# Patient Record
Sex: MALE | Race: BLACK OR AFRICAN AMERICAN | NOT HISPANIC OR LATINO | Employment: OTHER | URBAN - METROPOLITAN AREA
[De-identification: names, ages, dates, MRNs, and addresses within clinical notes are randomized per-mention and may not be internally consistent; named-entity substitution may affect disease eponyms.]

---

## 2022-01-13 ENCOUNTER — ANCILLARY PROCEDURE (OUTPATIENT)
Dept: GENERAL RADIOLOGY | Facility: CLINIC | Age: 32
End: 2022-01-13
Attending: FAMILY MEDICINE

## 2022-01-13 ENCOUNTER — OFFICE VISIT (OUTPATIENT)
Dept: URGENT CARE | Facility: URGENT CARE | Age: 32
End: 2022-01-13

## 2022-01-13 VITALS
SYSTOLIC BLOOD PRESSURE: 118 MMHG | TEMPERATURE: 98 F | OXYGEN SATURATION: 98 % | WEIGHT: 208 LBS | HEART RATE: 68 BPM | DIASTOLIC BLOOD PRESSURE: 72 MMHG | RESPIRATION RATE: 16 BRPM

## 2022-01-13 DIAGNOSIS — R20.2 PARESTHESIAS: ICD-10-CM

## 2022-01-13 DIAGNOSIS — R05.9 COUGH: Primary | ICD-10-CM

## 2022-01-13 DIAGNOSIS — J01.90 ACUTE SINUSITIS WITH SYMPTOMS > 10 DAYS: ICD-10-CM

## 2022-01-13 DIAGNOSIS — R06.02 SOB (SHORTNESS OF BREATH): ICD-10-CM

## 2022-01-13 LAB
ALBUMIN SERPL-MCNC: 4 G/DL (ref 3.4–5)
ALP SERPL-CCNC: 57 U/L (ref 40–150)
ALT SERPL W P-5'-P-CCNC: 43 U/L
ANION GAP SERPL CALCULATED.3IONS-SCNC: 1 MMOL/L (ref 3–14)
AST SERPL W P-5'-P-CCNC: 33 U/L (ref 0–45)
BASOPHILS # BLD AUTO: 0 10E3/UL (ref 0–0.2)
BASOPHILS NFR BLD AUTO: 0 %
BILIRUB SERPL-MCNC: 1.2 MG/DL (ref 0.2–1.3)
BUN SERPL-MCNC: 12 MG/DL (ref 7–30)
CALCIUM SERPL-MCNC: 9.8 MG/DL (ref 8.5–10.1)
CHLORIDE BLD-SCNC: 107 MMOL/L (ref 94–109)
CO2 SERPL-SCNC: 33 MMOL/L (ref 20–32)
CREAT SERPL-MCNC: 1 MG/DL (ref 0.66–1.25)
EOSINOPHIL # BLD AUTO: 0.2 10E3/UL (ref 0–0.7)
EOSINOPHIL NFR BLD AUTO: 5 %
ERYTHROCYTE [DISTWIDTH] IN BLOOD BY AUTOMATED COUNT: 12.2 % (ref 10–15)
ERYTHROCYTE [SEDIMENTATION RATE] IN BLOOD BY WESTERGREN METHOD: 8 MM/HR (ref 0–15)
GFR SERPL CREATININE-BSD FRML MDRD: >90 ML/MIN/1.73M2
GLUCOSE BLD-MCNC: 102 MG/DL (ref 70–99)
HCT VFR BLD AUTO: 43.1 % (ref 40–53)
HGB BLD-MCNC: 15 G/DL (ref 13.3–17.7)
LYMPHOCYTES # BLD AUTO: 1.5 10E3/UL (ref 0.8–5.3)
LYMPHOCYTES NFR BLD AUTO: 35 %
MCH RBC QN AUTO: 30.7 PG (ref 26.5–33)
MCHC RBC AUTO-ENTMCNC: 34.8 G/DL (ref 31.5–36.5)
MCV RBC AUTO: 88 FL (ref 78–100)
MONOCYTES # BLD AUTO: 0.4 10E3/UL (ref 0–1.3)
MONOCYTES NFR BLD AUTO: 8 %
NEUTROPHILS # BLD AUTO: 2.2 10E3/UL (ref 1.6–8.3)
NEUTROPHILS NFR BLD AUTO: 52 %
PLATELET # BLD AUTO: 293 10E3/UL (ref 150–450)
POTASSIUM BLD-SCNC: 4.7 MMOL/L (ref 3.4–5.3)
PROT SERPL-MCNC: 7.6 G/DL (ref 6.8–8.8)
RBC # BLD AUTO: 4.89 10E6/UL (ref 4.4–5.9)
SODIUM SERPL-SCNC: 141 MMOL/L (ref 133–144)
WBC # BLD AUTO: 4.3 10E3/UL (ref 4–11)

## 2022-01-13 PROCEDURE — 99203 OFFICE O/P NEW LOW 30 MIN: CPT | Performed by: FAMILY MEDICINE

## 2022-01-13 PROCEDURE — 71046 X-RAY EXAM CHEST 2 VIEWS: CPT | Performed by: RADIOLOGY

## 2022-01-13 PROCEDURE — 80050 GENERAL HEALTH PANEL: CPT

## 2022-01-13 PROCEDURE — U0005 INFEC AGEN DETEC AMPLI PROBE: HCPCS | Mod: 90 | Performed by: FAMILY MEDICINE

## 2022-01-13 PROCEDURE — 85652 RBC SED RATE AUTOMATED: CPT

## 2022-01-13 PROCEDURE — U0003 INFECTIOUS AGENT DETECTION BY NUCLEIC ACID (DNA OR RNA); SEVERE ACUTE RESPIRATORY SYNDROME CORONAVIRUS 2 (SARS-COV-2) (CORONAVIRUS DISEASE [COVID-19]), AMPLIFIED PROBE TECHNIQUE, MAKING USE OF HIGH THROUGHPUT TECHNOLOGIES AS DESCRIBED BY CMS-2020-01-R: HCPCS | Mod: 90 | Performed by: FAMILY MEDICINE

## 2022-01-13 PROCEDURE — 36415 COLL VENOUS BLD VENIPUNCTURE: CPT

## 2022-01-13 PROCEDURE — 99000 SPECIMEN HANDLING OFFICE-LAB: CPT | Performed by: FAMILY MEDICINE

## 2022-01-13 RX ORDER — ALBUTEROL SULFATE 90 UG/1
2 AEROSOL, METERED RESPIRATORY (INHALATION) EVERY 6 HOURS PRN
Qty: 18 G | Refills: 0 | Status: SHIPPED | OUTPATIENT
Start: 2022-01-13 | End: 2023-01-31

## 2022-01-13 NOTE — LETTER
January 13, 2022      Deedee Elder  [address] [place] [address]        To Whom It May Concern:    Deedee Elder  was seen on 1/13.  Please excuse him from work 1/13 - 1/16 due to illness, possible COVID infection.  COVID screen obtained today and he will need to quarantine (okay to work from home) until his results comes back (this may take upwards to 2-3 days).        Sincerely,        Joseph Munguia MD

## 2022-01-13 NOTE — PATIENT INSTRUCTIONS
Patient Education     Sinusitis (Antibiotic Treatment)    The sinuses are air-filled spaces within the bones of the face. They connect to the inside of the nose. Sinusitis is an inflammation of the tissue that lines the sinuses. Sinusitis can occur during a cold. It can also happen due to allergies to pollens and other particles in the air. Sinusitis can cause symptoms of sinus congestion and a feeling of fullness. A sinus infection causes fever, headache, and facial pain. There is often green or yellow fluid draining from the nose or into the back of the throat (post-nasal drip). You have been given antibiotics to treat this condition.   Home care    Take the full course of antibiotics as instructed. Don't stop taking them, even when you feel better.    Drink plenty of water, hot tea, and other liquids as directed by the healthcare provider. This may help thin nasal mucus. It also may help your sinuses drain fluids.    Heat may help soothe painful areas of your face. Use a towel soaked in hot water. Or,  the shower and direct the warm spray onto your face. Using a vaporizer along with a menthol rub at night may also help soothe symptoms.     An expectorant with guaifenesin may help thin nasal mucus and help your sinuses drain fluids. Talk with your provider or pharmacists before taking an over-the-counter (OTC) medicine if you have any questions about it or its side effects..    You can use an OTC decongestant, unless a similar medicine was prescribed to you. Nasal sprays work the fastest. Use one that contains phenylephrine or oxymetazoline. First blow your nose gently. Then use the spray. Don't use these medicines more often than directed on the label. If you do, your symptoms may get worse. You may also take pills that contain pseudoephedrine. Don t use products that combine multiple medicines. This is because side effects may be increased. Read labels. You can also ask the pharmacist for help. (People  with high blood pressure should not use decongestants. They can raise blood pressure.) Talk with your provider or pharmacist if you have any questions about the medicine..    OTC antihistamines may help if allergies contributed to your sinusitis. Talk with your provider or pharmacist if you have any questions about the medicine..    Don't use nasal rinses or irrigation during an acute sinus infection, unless your healthcare provider tells you to. Rinsing may spread the infection to other areas in your sinuses.    Use acetaminophen or ibuprofen to control pain, unless another pain medicine was prescribed to you. If you have chronic liver or kidney disease or ever had a stomach ulcer, talk with your healthcare provider before using these medicines. Never give aspirin to anyone under age 18 who is ill with a fever. It may cause severe liver damage.    Don't smoke. This can make symptoms worse.    Follow-up care  Follow up with your healthcare provider, or as advised.   When to seek medical advice  Call your healthcare provider if any of these occur:     Facial pain or headache that gets worse    Stiff neck    Unusual drowsiness or confusion    Swelling of your forehead or eyelids    Symptoms don't go away in 10 days    Vision problems, such as blurred or double vision    Fever of 100.4 F (38 C) or higher, or as directed by your healthcare provider  Call 911  Call 911 if any of these occur:     Seizure    Trouble breathing    Feeling dizzy or faint    Fingernails, skin or lips look blue, purple , or gray  Prevention  Here are steps you can take to help prevent an infection:     Keep good hand washing habits.    Don t have close contact with people who have sore throats, colds, or other upper respiratory infections.    Don t smoke, and stay away from secondhand smoke.    Stay up to date with of your vaccines.  [employer] last reviewed this educational content on 12/1/2019 2000-2021 [employer]. All rights  "reserved. This information is not intended as a substitute for professional medical care. Always follow your healthcare professional's instructions.           Patient Education     Paraesthesias  Paraesthesia is a burning or prickling sensation that is sometimes felt in the hands, arms, legs or feet. It can also occur in other parts of the body. It can also feel like tingling or numbness, skin crawling, or itching. The feeling is not comfortable, but it is not painful. (The \"pins and needles\" feeling that happens when a foot or hand \"falls asleep\" is a temporary paraesthesia.)  Paraesthesias that last or come and go may be caused by medical issues that need to be treated. These include stroke, a bulging disk pressing on a nerve, a trapped nerve, vitamin deficiencies, uncontrolled diabetes, alcohol abuse, or even certain medicines.  Tests are often done. These tests may include blood tests, X-ray, CT (computerized tomography) scan, nerve conduction studies (NCS), or a muscle test (electromyography). Depending on the cause, treatment may include physical therapy.  Home care    Tell your healthcare provider about all medicines you take. This includes prescription and over-the-counter medicines, vitamins, and herbs. Ask if any of the medicines may be causing your problems. Don't make any changes to prescription medicines without talking to your healthcare provider first.    You may be prescribed medicines to help relieve the tingling feeling or for pain. Take all medicines as directed.    A numb hand or foot may be more prone to injury. To help protect it:  ? Always use oven mitts.  ? Test water with an unaffected hand or foot.  ? Use caution when trimming nails. File sharp areas.  ? Wear shoes that fit well to avoid pressure points, blisters, and ulcers.  ? Inspect your hands and feet carefully (including the soles of your feet and between your toes) daily. If you see red areas, sores, or other problems, tell your " healthcare provider.  Follow-up care  Follow up with your doctor, or as advised. You may need further testing or evaluation.     When to seek medical advice  Call your healthcare provider right away if any of the following occur:    Numbness or weakness of the face, one arm, or one leg    Slurred speech, confusion, trouble speaking, walking, or seeing    Severe headache, fainting spell, dizziness, or seizure    Chest, arm, neck, or upper back pain    Loss of bladder or bowel control    Open wound with redness, swelling, or pus     [employer] last reviewed this educational content on 4/1/2018 2000-2021 [employer]. All rights reserved. This information is not intended as a substitute for professional medical care. Always follow your healthcare professional's instructions.           Patient Education       Coronavirus Disease 2019 (COVID-19): Overview  Coronavirus disease 2019 (COVID-19) is an illness that infects the lungs. It's caused by a type of coronavirus called SARS-CoV-2. There are many types of coronaviruses. They are a very common cause of colds and bronchitis. They can cause a lung infection called pneumonia. Symptoms can range from mild to severe. Some people have no symptoms. These viruses are also found in some animals.   The virus that causes COVID-19 changes (mutates) all the time. This is what all viruses do. It leads to different versions of a virus. These are called variants. COVID-19 variants may spread more easily from person to person. They may cause milder symptoms. Or they may cause more severe symptoms.    The virus spreads and infects people easily. It can infect a person more easily if they are not immune to it. The virus most often spreads through droplets of fluid that a person coughs or sneezes into the air. It may be spread to you if you touch a surface with the virus on it and then touch your eyes, nose, or mouth.      To help prevent spreading the infection, wash your  hands often, or use an alcohol-based hand .     For the latest information from the CDC:      Go to the CDC website    Call [phone] ([phone])    What are the symptoms of COVID-19?  Some people have no symptoms. Some have mild symptoms. And other people may have severe symptoms. Types of symptoms can vary from person to person. They may appear 2 to 14 days after contact with the virus. They can include:     Fever    Chills    Coughing    Trouble breathing or feeling short of breath    Sore throat    Stuffy or runny nose    Headache    Body aches    Tiredness    Nausea, vomiting, diarrhea, or abdominal pain    New loss of sense of smell or taste  Check your symptoms with the CDC s Coronavirus Self-.   What are possible complications of COVID-19?  The virus can cause an infection in the lungs. This is called pneumonia. In some cases, this can lead to death. Experts are still learning more about COVID-19 complications. Many other complications are possible. They include:     Low blood pressure    Kidney failure    Inflammation of the brain or heart    Rashes  Some people are at higher risk for complications. This includes:     Older adults    People with heart or lung disease    People with diabetes or kidney disease    People with health conditions that suppress the immune system    People who take medicines that suppress the immune system  Rarely, a child may have a severe complication. This is called multisystem inflammatory syndrome in children (MIS-C). MIS-C seems to be like Kawasaki disease. This is a rare illness. It causes inflammation of blood vessels and body organs. MIS can also happen in adults. But this is less common.     How is COVID-19 diagnosed?  Your healthcare provider will ask:    What symptoms you have    Where you live    If you ve traveled recently    If you ve had contact with sick people    If you are vaccinated against COVID-19    If you have had COVID-19   You may  have 1 of these tests for COVID-19:    Viral (molecular) test. You may also hear this called a PCR or RT-PCR test. Viral tests are very accurate. A viral test looks for the genetic material (RNA) of the SARS-CoV-2 virus. There are a few ways to do this. A swab may be wiped inside your nose or throat. Or a long swab may be put into your nose down to the back of your throat. Or a sample of your saliva may be taken. Your test results may be back in 45 minutes to a few hours. This depends on the type of test. Some tests must be sent to a lab. These can take several days for the results. Test kits you can use at home are now available. Some of these need a prescription. If you use a home kit, follow the instructions in the kit closely. Some kits show results quickly at home. Others must be sent to a lab for the results.    Antigen test. This can find proteins from the SARS-CoV-2 virus. A swab may be wiped inside your nose or throat. Or a long swab may be put into your nose down to the back of your throat. Some results are back within 15 to 60 minutes. This depends on the type of test. Positive results are very accurate. But false positive results can happen. And the results can be negative even in people with COVID-19. This is more common in places where not many people have the virus. Antigen tests are more likely to miss a COVID-19 infection than a viral (molecular) test. You may need to have a viral test if your antigen test is negative but you have symptoms of COVID-19.  If your provider thinks or confirms that you have COVID-19, you may have other tests. These tests may include:     Antibody blood test. This type of test can show if you had the virus in the past. It shows antibodies for the virus in the blood. The accuracy of these tests varies. And they are not available everywhere. An antibody test may not show if you have an infection right now. This is because it can take up to a few weeks for your body to make  antibodies. None of the antibody tests can yet be used to tell if a person is immune to the virus.    Sputum culture. If you have a wet cough, you may be asked to cough up a bit of mucus (sputum) from your lungs. This is tested for the virus. It may be tested for pneumonia.    Imaging tests. You may have a chest X-ray or CT scan.  Can you get COVID-19 again?  Yes, you can get COVID-19 more than once. You may have not gotten immunity. You could have lost the immunity. Or you may get COVID-19 from a different strain (variant) of the virus that you are not immune to. But the COVID-19 vaccine helps people who had COVID-19 lower their risk of having the illness again.   Vaccines for COVID-19  The FDA and CDC advise vaccines to help prevent COVID-19. The vaccines can also make the illness less severe. It can keep you from needing to go to the hospital.  And it can prevent the spread of the virus to other people. No vaccine is 100% effective at preventing an illness. But getting a vaccine is important. The Pfizer vaccine is available for people as young as age 5. Pregnant or breastfeeding people can have the vaccine. Ask your healthcare provider which vaccine may be best for you.   The vaccines are given as a shot (injection). This is most often done in a muscle in the upper arm. There is a 1-dose vaccine. This is from [employer]. There are 2-dose vaccines. These are from Pfizer and Moderna. For a 2-dose vaccine, the second dose is given several weeks after the first. Some people may need a third dose of Pfizer or Moderna.   Who needs a third dose of Pfizer or Moderna?  A third dose of the Pfizer or Moderna vaccine may be needed for some people. This is for people who have a very weak immune system. The third dose is part of their first (primary) series. It's not a booster. This can happen if you had a solid organ transplant. It can be caused by other conditions or treatments. This third dose is to help a person  with a weak immune system build up better protection against the virus. It's given at least 28 days after the second dose. Talk with your healthcare provider about your health risks to see if you need a third dose as part of your primary series.   COVID-19 vaccine booster shots  Everyone age 18 or older can get a COVID-19 booster shot. Here are your options.   Pfizer or Moderna booster  A booster shot of the Pfizer or Moderna vaccines is advised for many people. The booster shot is to be given at least 6 months after your primary series. Talk with your healthcare provider about your situation and risk. The CDC says these people should get a Pfizer or Moderna booster shot:       People age 50 or older    People age 18 or older who live in long-term care facilities    The CDC states people 18 to 49 may choose to get the booster. This depends on their specific case and risk. This includes people with certain health conditions. It also includes people whose job or living setting puts them at higher risk for COVID-19.   Socrates & Socrates booster  A booster shot of the 1-dose Socrates & Socrates vaccine is also available. It's advised for people ages 18 and older who got their first dose 2 or more months ago.   Mix and match  You may be able to choose which vaccine you get as a booster. This is called mix and match. Talk with your healthcare provider to learn more.   How is COVID-19 treated?  The most proven treatments right now are those to help your body while it fights the virus. This is known as supportive care. It includes:     Getting rest.This helps your body fight the illness.    Drinking fluids. Try to drink 6 to 8 glasses of fluids every day. Ask your provider which drinks are best for you. Don't have drinks with caffeine or alcohol.    Taking over-the-counter (OTC) medicine.  These are used to help ease pain and reduce fever. Ask your provider which OTC medicine is safe for you to use.  You may need to stay in  the hospital for severe illness. Your care may include:     IV (intravenous) fluids. These are given through a vein. This helps to replace fluids in your body.    Oxygen. You may be given supplemental oxygen. Or you may be put on a breathing machine (ventilator). This is done so you get enough oxygen in your body.    Prone positioning. Your healthcare team may regularly turn you on your stomach. This is called prone positioning. It helps increase the amount of oxygen you get to your lungs. Follow their instructions on position changes while you're in the hospital. Also follow their advice on the best positions to help your breathing once you go home.    Remdesivir. This is an antiviral medicine. The FDA has approved it for use on COVID-19. It works by stopping the spread of the SARS-CoV-2 virus in the body. It's approved only for people who are in the hospital. It's for people 12 years and older who weigh at least 88 pounds (40 kgs). In some cases, it may also be used for people younger than 12 years or who weigh less than 88 pounds (40 kgs).    Steroids or other anti-inflammatory medicines.  These are used to lessen the intense inflammation that some people with COVID-19 can have. The inflammation can lead to more trouble breathing. It can cause other complications or death.    COVID-19 convalescent plasma. Plasma is the liquid part of blood. People who had COVID-19 may be asked to donate plasma. This is called COVID-19 convalescent plasma. The plasma may have antibodies. These can help fight COVID-19 in people who are very ill with it. The FDA has approved it for emergency use in some people with severe but early COVID-19. Ask your provider if you qualify to donate.    Monoclonal antibody therapy. The FDA approved this for emergency use in some people. They must have a positive COVID-19 viral test. They must have mild to moderate symptoms. They can t be in the hospital. It's approved for people 12 years and older.  They must weigh at least 88 pounds (40 kgs). And they must be at high risk for severe COVID-19 and a hospital stay. This includes people who are 65 years and older. And it includes people with some chronic conditions. This therapy is not approved for people who are in the hospital with COVID-19 or who need oxygen. Your healthcare team will tell you if you qualify.  Are you at risk for COVID-19?  You are at risk for COVID-19 if any of these apply to you:    You live in an area with cases of COVID-19    You traveled to an area with cases of COVID-19    You had close contact with someone who had COVID-19  Close contact means being within 6 feet.   Keep in mind that COVID-19 may be spread by people who do not show symptoms.   Last updated: 11/22/2021   [employer] last reviewed this educational content on 9/1/2021 2000-2021 [employer]. All rights reserved. This information is not intended as a substitute for professional medical care. Always follow your healthcare professional's instructions.
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
## 2022-01-14 LAB
SARS-COV-2 RNA RESP QL NAA+PROBE: DETECTED
SARS-COV-2 RNA RESP QL NAA+PROBE: NORMAL
TSH SERPL DL<=0.005 MIU/L-ACNC: 0.75 MU/L (ref 0.4–4)

## 2022-02-06 ENCOUNTER — HEALTH MAINTENANCE LETTER (OUTPATIENT)
Age: 32
End: 2022-02-06

## 2022-06-01 ENCOUNTER — TRANSFERRED RECORDS (OUTPATIENT)
Dept: HEALTH INFORMATION MANAGEMENT | Facility: CLINIC | Age: 32
End: 2022-06-01

## 2022-10-03 ENCOUNTER — HEALTH MAINTENANCE LETTER (OUTPATIENT)
Age: 32
End: 2022-10-03

## 2022-10-19 ENCOUNTER — TRANSFERRED RECORDS (OUTPATIENT)
Dept: HEALTH INFORMATION MANAGEMENT | Facility: CLINIC | Age: 32
End: 2022-10-19

## 2022-10-19 LAB
CREATININE (EXTERNAL): 1.09 MG/DL (ref 0.5–1.39)
GFR ESTIMATED (EXTERNAL): >60 ML/MIN/1.73M2
GLUCOSE (EXTERNAL): 79 MG/DL (ref 60–199)
POTASSIUM (EXTERNAL): 3.8 MMOL/L (ref 3.6–5.1)

## 2023-01-31 ENCOUNTER — VIRTUAL VISIT (OUTPATIENT)
Dept: FAMILY MEDICINE | Facility: CLINIC | Age: 33
End: 2023-01-31
Payer: MEDICAID

## 2023-01-31 DIAGNOSIS — Z00.00 HEALTHCARE MAINTENANCE: Primary | ICD-10-CM

## 2023-01-31 PROCEDURE — 99213 OFFICE O/P EST LOW 20 MIN: CPT | Mod: 95 | Performed by: FAMILY MEDICINE

## 2023-01-31 NOTE — PROGRESS NOTES
Deedee is a 32 year old who is being evaluated via a billable video visit.      How would you like to obtain your AVS? MyChart  If the video visit is dropped, the invitation should be resent by: Text to cell phone: [phone]  Will anyone else be joining your video visit? No      32-year-old  Calling to establish care    Recently moved from [place].  Visually from [place]  Sounds like he did some present time fairly recently    Has worked as a , now working some, freelancing  Recently quit smoking    Trying to avoid a lot of weight gain.  Since quitting smoking has started eating more  Trying to be healthy.  1. Healthcare maintenance  Patient indicates that he thinks he had a tetanus shot 2 or 3 years ago.  Not interested in flu or COVID shots    Had cholesterol checked 6 months ago and will bring in results when he comes    Discussed diet and weight gain-recommend low carbohydrate, lots of fruits and vegetables.  Patient taking chlorophyll and some nutritional alcantara    We will see him back in person in the near future.  Call to set up appointment    Video-Visit Details    Type of service:  Video Visit   Video Start Time: 524  Video End Time:540    Originating Location (pt. Location): Home    Distant Location (provider location):  On-site  Platform used for Video Visit: Christiane    Answers for HPI/ROS submitted by the patient on 1/31/2023  What is the reason for your visit today? : Establish Care

## 2023-02-07 ENCOUNTER — OFFICE VISIT (OUTPATIENT)
Dept: FAMILY MEDICINE | Facility: CLINIC | Age: 33
End: 2023-02-07
Payer: COMMERCIAL

## 2023-02-07 VITALS
SYSTOLIC BLOOD PRESSURE: 132 MMHG | HEART RATE: 70 BPM | RESPIRATION RATE: 20 BRPM | HEIGHT: 75 IN | OXYGEN SATURATION: 98 % | BODY MASS INDEX: 27.1 KG/M2 | WEIGHT: 218 LBS | DIASTOLIC BLOOD PRESSURE: 80 MMHG

## 2023-02-07 DIAGNOSIS — S62.339P CLOSED BOXER'S FRACTURE WITH MALUNION, SUBSEQUENT ENCOUNTER: ICD-10-CM

## 2023-02-07 DIAGNOSIS — Z00.00 HEALTHCARE MAINTENANCE: ICD-10-CM

## 2023-02-07 DIAGNOSIS — M75.81 TENDINITIS OF RIGHT ROTATOR CUFF: Primary | ICD-10-CM

## 2023-02-07 PROCEDURE — 99213 OFFICE O/P EST LOW 20 MIN: CPT | Performed by: FAMILY MEDICINE

## 2023-02-07 ASSESSMENT — ENCOUNTER SYMPTOMS
CONSTIPATION: 0
EYE PAIN: 0
PALPITATIONS: 0
FREQUENCY: 0
SORE THROAT: 0
COUGH: 0
WEAKNESS: 0
PARESTHESIAS: 0
MYALGIAS: 0
SHORTNESS OF BREATH: 0
DYSURIA: 0
NERVOUS/ANXIOUS: 0
JOINT SWELLING: 0
CHILLS: 0
ABDOMINAL PAIN: 0
HEARTBURN: 0
HEMATOCHEZIA: 0
DIARRHEA: 0
DIZZINESS: 0
HEMATURIA: 0
HEADACHES: 0
NAUSEA: 0
ARTHRALGIAS: 0
FEVER: 0

## 2023-02-07 NOTE — ASSESSMENT & PLAN NOTE
Primarily supraspinatus tendon.  No acute trauma in the past but does a lot of lifting and boxing.  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    Referral to physical therapy  Discussed option of corticosteroid injection at some point if not improving/referral to Ortho.

## 2023-02-07 NOTE — PROGRESS NOTES
"Assessment/ Plan  Tendinitis of right rotator cuff  Primarily supraspinatus tendon.  No acute trauma in the past but does a lot of lifting and boxing.  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    Referral to physical therapy  Discussed option of corticosteroid injection at some point if not improving/referral to Ortho.    Healthcare maintenance  Fairly recent preventative health visit with STD testing, cholesterol and blood sugar in [place] before he came.  AUGUSTINA has been requested.  Not currently sexually active    Closed boxer's fracture with malunion, subsequent encounter  Treated nonsurgically in the last couple of years.  Thinks he may have reinjured it.  Pain with boxing, does have some obvious curvature.  Referral to hand surgery.     Subjective  CC:  chief complaint  HPI:  Right shoulder pain, 3-week history, came on fairly gradually without injury.  Though began after starting to work with heavy punching bag.  Also does a lot of lifting/bench pressing.  Pain anterior/superior glenohumeral region.  Hurts with certain movements  PFSH:  Patient Active Problem List   Diagnosis     Closed boxer's fracture with malunion, subsequent encounter     Healthcare maintenance     Tendinitis of right rotator cuff     No current outpatient medications on file prior to visit.  No current facility-administered medications on file prior to visit.       History   Smoking Status     Never   Smokeless Tobacco     Never     Social History     Social History Narrative    Single    Late 2022 moved from [place]        boxing     Patient Care Team:  No Ref-Primary, Physician as PCP - General        Objective  Physical Exam  Vitals:    02/07/23 1051   BP: 132/80   BP Location: Left arm   Patient Position: Sitting   Cuff Size: Adult Large   Pulse: 70   Resp: 20   SpO2: 98%   Weight: 98.9 kg (218 lb)   Height: 1.905 m (6' 3\")     Body mass index is 27.25 kg/m .  Symmetric musculature of shoulder " muscles and scapular muscles when viewed from behind.  No pain to palpation on AC joint coracoid process and glenohumeral joint on affected shoulder.  Passive range of motion to 90  is normal and pain-free.  Negative apprehension test.    There is no pain on resisted internal rotation and intact strength.  There is no pain on resisted external rotation and intact strength  Empty can sign is positive    Diagnostics:   None      Please note: Voice recognition software was used in this dictation.  It may therefore contain typographical errors.      Answers for HPI/ROS submitted by the patient on 2/7/2023  Frequency of exercise:: 6-7 days/week  Getting at least 3 servings of Calcium per day:: NO  Diet:: Regular (no restrictions)  Taking medications regularly:: Not Applicable  Medication side effects:: Not applicable  Bi-annual eye exam:: Yes  Dental care twice a year:: Yes  Sleep apnea or symptoms of sleep apnea:: None  abdominal pain: No  Blood in stool: No  Blood in urine: No  chest pain: No  chills: No  congestion: No  constipation: No  cough: No  diarrhea: No  dizziness: No  ear pain: No  eye pain: No  nervous/anxious: No  fever: No  frequency: No  genital sores: No  headaches: No  hearing loss: No  heartburn: No  arthralgias: No  joint swelling: No  peripheral edema: No  mood changes: No  myalgias: No  nausea: No  dysuria: No  palpitations: No  Skin sensation changes: No  sore throat: No  urgency: No  rash: No  shortness of breath: No  visual disturbance: No  weakness: No  impotence: No  penile discharge: No  Additional concerns today:: No  Duration of exercise:: Greater than 60 minutes

## 2023-02-07 NOTE — ASSESSMENT & PLAN NOTE
Fairly recent preventative health visit with STD testing, cholesterol and blood sugar in [place] before he came.  AUGUSTINA has been requested.  Not currently sexually active

## 2023-02-07 NOTE — ASSESSMENT & PLAN NOTE
Treated nonsurgically in the last couple of years.  Thinks he may have reinjured it.  Pain with boxing, does have some obvious curvature.  Referral to hand surgery.
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
## 2023-02-12 ENCOUNTER — HEALTH MAINTENANCE LETTER (OUTPATIENT)
Age: 33
End: 2023-02-12

## 2023-02-13 ENCOUNTER — THERAPY VISIT (OUTPATIENT)
Dept: PHYSICAL THERAPY | Facility: CLINIC | Age: 33
End: 2023-02-13
Attending: FAMILY MEDICINE
Payer: COMMERCIAL

## 2023-02-13 DIAGNOSIS — M75.81 TENDINITIS OF RIGHT ROTATOR CUFF: ICD-10-CM

## 2023-02-13 PROCEDURE — 97161 PT EVAL LOW COMPLEX 20 MIN: CPT | Mod: GP | Performed by: PHYSICAL THERAPIST

## 2023-02-13 PROCEDURE — 97110 THERAPEUTIC EXERCISES: CPT | Mod: GP | Performed by: PHYSICAL THERAPIST

## 2023-02-13 NOTE — PROGRESS NOTES
[facility]    OUTPATIENT Physical Therapy ORTHOPEDIC EVALUATION  PLAN OF TREATMENT FOR OUTPATIENT REHABILITATION  (COMPLETE FOR INITIAL CLAIMS ONLY)  Patient's Last Name, First Name, M.I.  YOB: 1990  Deedee Elder    Provider s Name:  [facility]   Medical Record No.  [id number]   Start of Care Date:  02/13/23   Onset Date:   02/07/23 (MD order)   Treatment Diagnosis:    Medical Diagnosis:  Tendinitis of right rotator cuff       Goals:     02/13/23 0500   Body Part   Goals listed below are for Right shoulder   Goal #1   Goal #1 reaching   Previous Functional Level No restrictions   Current Functional Level Can reach ;behind back;to shoulder level;overhead;out to the side   Performance level with 6/10 pain   STG Target Performance Reach ;to shoulder level;out to the side   Performance level with 2/10 pain   Rationale for dressing;for bathing   Due date 03/06/23   LTG Target Performance Reach;overhead;out to the side;behind back   Performance Level with 1/10 pain   Rationale for dressing;for bathing;for safe driving, hook seat belt, reach shift lever and turn signal, using both hands on steering wheel   Due date 04/10/23         Therapy Frequency:  1x per week for 6 weeks, tapering to 2x per month 2 months  Predicted Duration of Therapy Intervention:  3 months    Ethel Hopkins, PT                 I CERTIFY THE NEED FOR THESE SERVICES FURNISHED UNDER        THIS PLAN OF TREATMENT AND WHILE UNDER MY CARE     (Physician attestation of this document indicates review and certification of the therapy plan).                     Certification Date From:  02/13/23   Certification Date To:  05/04/23    Referring Provider:  Robin Connolly    Initial Assessment        See Epic Evaluation SOC Date: 02/13/23

## 2023-03-02 ENCOUNTER — TELEPHONE (OUTPATIENT)
Dept: PHYSICAL THERAPY | Facility: CLINIC | Age: 33
End: 2023-03-02

## 2023-03-02 DIAGNOSIS — M75.81 TENDINITIS OF RIGHT ROTATOR CUFF: Primary | ICD-10-CM

## 2023-03-02 NOTE — TELEPHONE ENCOUNTER
Left VM on patient's phone on 3/2/2023 at 10:32am stating future appointments will be canceled due to no show policy. Patient was also informed that he can make future appointments, but will have to call the clinic at [phone] to do so.

## 2023-03-14 ENCOUNTER — OFFICE VISIT (OUTPATIENT)
Dept: FAMILY MEDICINE | Facility: CLINIC | Age: 33
End: 2023-03-14
Payer: COMMERCIAL

## 2023-03-14 VITALS
TEMPERATURE: 98.2 F | BODY MASS INDEX: 27.25 KG/M2 | SYSTOLIC BLOOD PRESSURE: 132 MMHG | DIASTOLIC BLOOD PRESSURE: 74 MMHG | WEIGHT: 218 LBS | OXYGEN SATURATION: 99 % | HEART RATE: 71 BPM

## 2023-03-14 DIAGNOSIS — R82.90 ABNORMAL URINALYSIS: ICD-10-CM

## 2023-03-14 DIAGNOSIS — Z11.59 NEED FOR HEPATITIS C SCREENING TEST: ICD-10-CM

## 2023-03-14 DIAGNOSIS — Z11.4 SCREENING FOR HIV (HUMAN IMMUNODEFICIENCY VIRUS): ICD-10-CM

## 2023-03-14 DIAGNOSIS — Z09 HOSPITAL DISCHARGE FOLLOW-UP: Primary | ICD-10-CM

## 2023-03-14 LAB
ALBUMIN UR-MCNC: NEGATIVE MG/DL
APPEARANCE UR: CLEAR
BILIRUB UR QL STRIP: NEGATIVE
COLOR UR AUTO: YELLOW
GLUCOSE UR STRIP-MCNC: NEGATIVE MG/DL
HGB UR QL STRIP: NEGATIVE
KETONES UR STRIP-MCNC: NEGATIVE MG/DL
LEUKOCYTE ESTERASE UR QL STRIP: NEGATIVE
NITRATE UR QL: NEGATIVE
PH UR STRIP: 7 [PH] (ref 5–8)
SP GR UR STRIP: 1.02 (ref 1–1.03)
UROBILINOGEN UR STRIP-ACNC: 1 E.U./DL

## 2023-03-14 PROCEDURE — 81003 URINALYSIS AUTO W/O SCOPE: CPT | Performed by: FAMILY MEDICINE

## 2023-03-14 PROCEDURE — 99214 OFFICE O/P EST MOD 30 MIN: CPT | Performed by: FAMILY MEDICINE

## 2023-03-14 NOTE — PROGRESS NOTES
"  Assessment & Plan     Hospital discharge follow-up  Recent ER visit for influenza-like symptoms, cough nausea constipation diarrhea.  Overall symptoms improved with symptomatic care and fluid.  Discussed possibly follow-up with gastroenterologist if recurrent blood in the stool.  Abnormal urinalysis  Urine done in the ER showed urobilinogen, but normal UA today  - UA Macroscopic with reflex to Microscopic and Culture; Future  - UA Macroscopic with reflex to Microscopic and Culture    Screening for HIV (human immunodeficiency virus)   HIV Antigen Antibody Combo; Future    Need for hepatitis C screening test    - Hepatitis C Screen Reflex to HCV RNA Quant and Genotype; Future    Review of external notes as documented elsewhere in note  30 minutes spent on the date of the encounter doing chart review, review of outside records, review of test results, interpretation of tests, patient visit and documentation      MED REC REQUIRED  Post Medication Reconciliation Status:  Discharge medications reconciled, continue medications without change    Nicotine/Tobacco Cessation:  He reports that he has been smoking cigarettes. He has never used smokeless tobacco.  Nicotine/Tobacco Cessation Plan:   Self help information given to patient      BMI:   Estimated body mass index is 27.25 kg/m  as calculated from the following:    Height as of 2/7/23: 1.905 m (6' 3\").    Weight as of this encounter: 98.9 kg (218 lb).   Weight management plan: Discussed healthy diet and exercise guidelines    Regular exercise    No follow-ups on file.    Elle Wells MD  [facility]    Nick Mark is a 32 year old accompanied by his self, presenting for the following health issues:  Hospital F/U, Back Pain, and Referral (Colonoscopy)      HPI     Hospital Follow-up Visit:    Hospital/Nursing Home/ Rehab Facility: [facility] ER  Date of Admission: 3/8  Date of Discharge: 3/8  Reason(s) for Admission: Influenza-like " symptoms with diarrhea cough sneezing    Was your hospitalization related to COVID-19? No   Problems taking medications regularly:  None  Medication changes since discharge: None  Problems adhering to non-medication therapy:  None    Summary of hospitalization:  CareEverywhere information obtained and reviewed  Diagnostic Tests/Treatments reviewed.  Follow up needed: none  Other Healthcare Providers Involved in Patient s Care:         None  Update since discharge: improved.         Plan of care communicated with patient           Patient is following on an ER visit, was seen on March 8, 2023 at [facility] ER for gastrointestinal symptoms included diarrhea, nausea, associated with URI, cough sneezing runny small amount of bright red blood in the stool.  Nothing since then.  Patient did change his diet, his symptoms are improved.  He was asked to follow-up and recheck a urine, denies any burning urination.    Review of Systems   Constitutional, HEENT, cardiovascular, pulmonary, gi and gu systems are negative, except as otherwise noted.      Objective    /74 (BP Location: Right arm, Patient Position: Sitting, Cuff Size: Adult Large)   Pulse 71   Temp 98.2  F (36.8  C) (Temporal)   Wt 98.9 kg (218 lb)   SpO2 99%   BMI 27.25 kg/m    Body mass index is 27.25 kg/m .  Physical Exam   GENERAL: healthy, alert and no distress  NECK: no adenopathy, no asymmetry, masses, or scars and thyroid normal to palpation  RESP: lungs clear to auscultation - no rales, rhonchi or wheezes  CV: regular rate and rhythm, normal S1 S2, no S3 or S4, no murmur, click or rub, no peripheral edema and peripheral pulses strong  ABDOMEN: soft, nontender, no hepatosplenomegaly, no masses and bowel sounds normal  MS: no gross musculoskeletal defects noted, no edema    Results for orders placed or performed in visit on 03/14/23   UA Macroscopic with reflex to Microscopic and Culture     Status: Normal    Specimen: Urine, Midstream   Result Value  Ref Range    Color Urine Yellow Colorless, Straw, Light Yellow, Yellow    Appearance Urine Clear Clear    Glucose Urine Negative Negative mg/dL    Bilirubin Urine Negative Negative    Ketones Urine Negative Negative mg/dL    Specific Gravity Urine 1.020 1.005 - 1.030    Blood Urine Negative Negative    pH Urine 7.0 5.0 - 8.0    Protein Albumin Urine Negative Negative mg/dL    Urobilinogen Urine 1.0 0.2, 1.0 E.U./dL    Nitrite Urine Negative Negative    Leukocyte Esterase Urine Negative Negative    Narrative    Microscopic not indicated       This note was dictated using a voice recognition software.  Any grammatical or context distortion are unintentional and inherent to the software.

## 2023-04-24 PROBLEM — M75.81 TENDINITIS OF RIGHT ROTATOR CUFF: Status: RESOLVED | Noted: 2023-02-07 | Resolved: 2023-04-24

## 2023-04-24 NOTE — PROGRESS NOTES
Discharge Note    Progress reporting period is from initial evaluation date (please see noted date below) to Feb 13, 2023.  Linked Episodes   Type: Episode: Status: Noted: Resolved: Last update: Updated by:   PHYSICAL THERAPY Right shoulder pain 2/13/2023 Active 2/13/2023  3/2/2023 10:32 AM Ethel Howe, PT      Comments:       Deedee failed to follow up and current status is unknown.  Please see information below for last relevant information on current status.  Patient seen for 1 visits.    SUBJECTIVE  Subjective changes noted by patient:  see eval  .  Current pain level is  .     Previous pain level was   .   Changes in function:  Yes (See Goal flowsheet attached for changes in current functional level)  Adverse reaction to treatment or activity: None    OBJECTIVE  Changes noted in objective findings: see eval     ASSESSMENT/PLAN      Updated problem list and treatment plan:   Pain - HEP  Decreased ROM/flexibility - HEP  Decreased function - HEP  Decreased strength - HEP  STG/LTGs have been met or progress has been made towards goals:  Yes, please see goal flowsheet for most current information  Assessment of Progress: current status is unknown.    Last current status:     Self Management Plans:  HEP  I have re-evaluated this patient and find that the nature, scope, duration and intensity of the therapy is appropriate for the medical condition of the patient.  Deedee continues to require the following intervention to meet STG and LTG's:  HEP.    Recommendations:  Discharge with current home program.  Patient to follow up with MD as needed.    Please refer to the daily flowsheet for treatment today, total treatment time and time spent performing 1:1 timed codes.

## 2024-03-09 ENCOUNTER — HEALTH MAINTENANCE LETTER (OUTPATIENT)
Age: 34
End: 2024-03-09

## 2025-03-16 ENCOUNTER — HEALTH MAINTENANCE LETTER (OUTPATIENT)
Age: 35
End: 2025-03-16